# Patient Record
Sex: MALE | Race: WHITE | NOT HISPANIC OR LATINO
[De-identification: names, ages, dates, MRNs, and addresses within clinical notes are randomized per-mention and may not be internally consistent; named-entity substitution may affect disease eponyms.]

---

## 2024-09-24 ENCOUNTER — NON-APPOINTMENT (OUTPATIENT)
Age: 49
End: 2024-09-24

## 2024-09-24 ENCOUNTER — APPOINTMENT (OUTPATIENT)
Dept: ORTHOPEDIC SURGERY | Facility: CLINIC | Age: 49
End: 2024-09-24
Payer: OTHER MISCELLANEOUS

## 2024-09-24 VITALS — BODY MASS INDEX: 27.43 KG/M2 | HEIGHT: 73 IN | WEIGHT: 207 LBS

## 2024-09-24 DIAGNOSIS — M75.52 BURSITIS OF LEFT SHOULDER: ICD-10-CM

## 2024-09-24 DIAGNOSIS — I10 ESSENTIAL (PRIMARY) HYPERTENSION: ICD-10-CM

## 2024-09-24 DIAGNOSIS — S46.912A STRAIN OF UNSPECIFIED MUSCLE, FASCIA AND TENDON AT SHOULDER AND UPPER ARM LEVEL, LEFT ARM, INITIAL ENCOUNTER: ICD-10-CM

## 2024-09-24 DIAGNOSIS — Z00.00 ENCOUNTER FOR GENERAL ADULT MEDICAL EXAMINATION W/OUT ABNORMAL FINDINGS: ICD-10-CM

## 2024-09-24 PROCEDURE — 99204 OFFICE O/P NEW MOD 45 MIN: CPT

## 2024-09-24 PROCEDURE — 73010 X-RAY EXAM OF SHOULDER BLADE: CPT | Mod: LT

## 2024-09-24 PROCEDURE — 73030 X-RAY EXAM OF SHOULDER: CPT | Mod: LT

## 2024-09-24 RX ORDER — DICLOFENAC SODIUM 75 MG/1
75 TABLET, DELAYED RELEASE ORAL TWICE DAILY
Qty: 60 | Refills: 2 | Status: ACTIVE | COMMUNITY
Start: 2024-09-24 | End: 2024-12-23

## 2024-09-24 NOTE — ASSESSMENT
[FreeTextEntry1] : Left shoulder work injury. Diclofenac BID. Ice. MRI L shoulder to eval for tear. He is working. RTO for review.

## 2024-09-24 NOTE — WORK
[Sprain/Strain] : sprain/strain [Was the competent medical cause of the injury] : was the competent medical cause of the injury [Are consistent with the injury] : are consistent with the injury [Consistent with my objective findings] : consistent with my objective findings [Partial] : partial [Does not reveal pre-existing condition(s) that may affect treatment/prognosis] : does not reveal pre-existing condition(s) that may affect treatment/prognosis [Can return to work without limitations on ______] : can return to work without limitations on [unfilled] [Unknown at this time] : : unknown at this time [Patient] : patient [I provided the services listed above] :  I provided the services listed above.

## 2024-09-24 NOTE — PHYSICAL EXAM
[Left] : left shoulder [5 ___] : forward flexion 5[unfilled]/5 [5___] : internal rotation 5[unfilled]/5 [] : no AC joint tenderness [FreeTextEntry9] :  ER 40

## 2024-09-24 NOTE — HISTORY OF PRESENT ILLNESS
[Sharp] : sharp [Work] : work [Sleep] : sleep [de-identified] : WC 8/26/24   9/24/24: 48 yo RHD male with left shoulder pain since 8/26/24. He states he twisted his arm while trying to pull/lift a heavy box off a shelf. He reports feeling immediate pain in his shoulder as lifting. He tried to take Tylenol. He continues to have pain with reaching. He has pain at night. No prior injuries. He is working.   PMHx: HTN [] : no [FreeTextEntry1] : Lt shoulder pain [FreeTextEntry3] : 8/26/2024 [FreeTextEntry5] : New Patient for WC. Patient was moving boxes at work and felt a sharp pain. Patient is now feeling better strength and mobility but when moving awkwardly at times he can feel the pain. Pain is localized. Patient is not in PT.  [FreeTextEntry8] : Showering moving hand in back [de-identified] : Works for the [employer] with [employer] (not physical work)

## 2024-09-25 ENCOUNTER — APPOINTMENT (OUTPATIENT)
Dept: MRI IMAGING | Facility: CLINIC | Age: 49
End: 2024-09-25

## 2024-09-25 ENCOUNTER — RESULT REVIEW (OUTPATIENT)
Age: 49
End: 2024-09-25

## 2024-10-08 ENCOUNTER — APPOINTMENT (OUTPATIENT)
Dept: ORTHOPEDIC SURGERY | Facility: CLINIC | Age: 49
End: 2024-10-08
Payer: OTHER MISCELLANEOUS

## 2024-10-08 ENCOUNTER — NON-APPOINTMENT (OUTPATIENT)
Age: 49
End: 2024-10-08

## 2024-10-08 DIAGNOSIS — M75.112 INCOMPLETE ROTATOR CUFF TEAR OR RUPTURE OF LEFT SHOULDER, NOT SPECIFIED AS TRAUMATIC: ICD-10-CM

## 2024-10-08 DIAGNOSIS — M75.52 BURSITIS OF LEFT SHOULDER: ICD-10-CM

## 2024-10-08 DIAGNOSIS — S43.432D SUPERIOR GLENOID LABRUM LESION OF LEFT SHOULDER, SUBSEQUENT ENCOUNTER: ICD-10-CM

## 2024-10-08 PROCEDURE — 99214 OFFICE O/P EST MOD 30 MIN: CPT

## 2024-10-08 RX ORDER — MELOXICAM 15 MG/1
15 TABLET ORAL DAILY
Qty: 30 | Refills: 2 | Status: ACTIVE | COMMUNITY
Start: 2024-10-08 | End: 2025-01-06

## 2024-11-19 ENCOUNTER — NON-APPOINTMENT (OUTPATIENT)
Age: 49
End: 2024-11-19

## 2024-11-19 ENCOUNTER — APPOINTMENT (OUTPATIENT)
Dept: ORTHOPEDIC SURGERY | Facility: CLINIC | Age: 49
End: 2024-11-19
Payer: OTHER MISCELLANEOUS

## 2024-11-19 DIAGNOSIS — M75.52 BURSITIS OF LEFT SHOULDER: ICD-10-CM

## 2024-11-19 DIAGNOSIS — M75.112 INCOMPLETE ROTATOR CUFF TEAR OR RUPTURE OF LEFT SHOULDER, NOT SPECIFIED AS TRAUMATIC: ICD-10-CM

## 2024-11-19 DIAGNOSIS — S43.432D SUPERIOR GLENOID LABRUM LESION OF LEFT SHOULDER, SUBSEQUENT ENCOUNTER: ICD-10-CM

## 2024-11-19 PROCEDURE — 99213 OFFICE O/P EST LOW 20 MIN: CPT

## 2025-01-07 ENCOUNTER — NON-APPOINTMENT (OUTPATIENT)
Age: 50
End: 2025-01-07

## 2025-01-07 ENCOUNTER — APPOINTMENT (OUTPATIENT)
Dept: ORTHOPEDIC SURGERY | Facility: CLINIC | Age: 50
End: 2025-01-07
Payer: OTHER MISCELLANEOUS

## 2025-01-07 DIAGNOSIS — M75.112 INCOMPLETE ROTATOR CUFF TEAR OR RUPTURE OF LEFT SHOULDER, NOT SPECIFIED AS TRAUMATIC: ICD-10-CM

## 2025-01-07 DIAGNOSIS — S43.432D SUPERIOR GLENOID LABRUM LESION OF LEFT SHOULDER, SUBSEQUENT ENCOUNTER: ICD-10-CM

## 2025-01-07 PROCEDURE — 99214 OFFICE O/P EST MOD 30 MIN: CPT

## 2025-03-11 ENCOUNTER — APPOINTMENT (OUTPATIENT)
Dept: ORTHOPEDIC SURGERY | Facility: CLINIC | Age: 50
End: 2025-03-11
Payer: OTHER MISCELLANEOUS

## 2025-03-11 ENCOUNTER — NON-APPOINTMENT (OUTPATIENT)
Age: 50
End: 2025-03-11

## 2025-03-11 DIAGNOSIS — M75.112 INCOMPLETE ROTATOR CUFF TEAR OR RUPTURE OF LEFT SHOULDER, NOT SPECIFIED AS TRAUMATIC: ICD-10-CM

## 2025-03-11 DIAGNOSIS — S43.432D SUPERIOR GLENOID LABRUM LESION OF LEFT SHOULDER, SUBSEQUENT ENCOUNTER: ICD-10-CM

## 2025-03-11 DIAGNOSIS — M75.52 BURSITIS OF LEFT SHOULDER: ICD-10-CM

## 2025-03-11 PROCEDURE — 99213 OFFICE O/P EST LOW 20 MIN: CPT

## 2025-06-10 ENCOUNTER — APPOINTMENT (OUTPATIENT)
Dept: ORTHOPEDIC SURGERY | Facility: CLINIC | Age: 50
End: 2025-06-10
Payer: OTHER MISCELLANEOUS

## 2025-06-10 PROCEDURE — 99213 OFFICE O/P EST LOW 20 MIN: CPT

## 2025-09-15 ENCOUNTER — APPOINTMENT (OUTPATIENT)
Dept: DERMATOLOGY | Facility: CLINIC | Age: 50
End: 2025-09-15
Payer: COMMERCIAL

## 2025-09-15 ENCOUNTER — NON-APPOINTMENT (OUTPATIENT)
Age: 50
End: 2025-09-15

## 2025-09-15 VITALS — BODY MASS INDEX: 27.3 KG/M2 | HEIGHT: 73 IN | WEIGHT: 206 LBS

## 2025-09-15 DIAGNOSIS — Z12.83 ENCOUNTER FOR SCREENING FOR MALIGNANT NEOPLASM OF SKIN: ICD-10-CM

## 2025-09-15 DIAGNOSIS — L82.0 INFLAMED SEBORRHEIC KERATOSIS: ICD-10-CM

## 2025-09-15 DIAGNOSIS — D22.9 MELANOCYTIC NEVI, UNSPECIFIED: ICD-10-CM

## 2025-09-15 DIAGNOSIS — L81.4 OTHER MELANIN HYPERPIGMENTATION: ICD-10-CM

## 2025-09-15 PROCEDURE — 17110 DESTRUCTION B9 LES UP TO 14: CPT

## 2025-09-15 PROCEDURE — 99203 OFFICE O/P NEW LOW 30 MIN: CPT | Mod: 25
